# Patient Record
Sex: MALE | HISPANIC OR LATINO | Employment: FULL TIME | URBAN - METROPOLITAN AREA
[De-identification: names, ages, dates, MRNs, and addresses within clinical notes are randomized per-mention and may not be internally consistent; named-entity substitution may affect disease eponyms.]

---

## 2023-07-27 ENCOUNTER — APPOINTMENT (OUTPATIENT)
Dept: GENERAL RADIOLOGY | Facility: CLINIC | Age: 33
End: 2023-07-27
Attending: EMERGENCY MEDICINE

## 2023-07-27 ENCOUNTER — HOSPITAL ENCOUNTER (EMERGENCY)
Facility: CLINIC | Age: 33
Discharge: HOME | End: 2023-07-27
Attending: EMERGENCY MEDICINE | Admitting: EMERGENCY MEDICINE

## 2023-07-27 VITALS
OXYGEN SATURATION: 97 % | HEIGHT: 64 IN | WEIGHT: 160 LBS | BODY MASS INDEX: 27.31 KG/M2 | DIASTOLIC BLOOD PRESSURE: 76 MMHG | TEMPERATURE: 97 F | SYSTOLIC BLOOD PRESSURE: 142 MMHG | RESPIRATION RATE: 16 BRPM | HEART RATE: 76 BPM

## 2023-07-27 DIAGNOSIS — S63.501A WRIST SPRAIN, RIGHT, INITIAL ENCOUNTER: ICD-10-CM

## 2023-07-27 PROCEDURE — 99283 EMERGENCY DEPT VISIT LOW MDM: CPT

## 2023-07-27 PROCEDURE — 250N000013 HC RX MED GY IP 250 OP 250 PS 637: Performed by: EMERGENCY MEDICINE

## 2023-07-27 PROCEDURE — 73110 X-RAY EXAM OF WRIST: CPT | Mod: RT

## 2023-07-27 RX ORDER — IBUPROFEN 600 MG/1
600 TABLET, FILM COATED ORAL ONCE
Status: COMPLETED | OUTPATIENT
Start: 2023-07-27 | End: 2023-07-27

## 2023-07-27 RX ADMIN — IBUPROFEN 600 MG: 600 TABLET ORAL at 10:53

## 2023-07-27 NOTE — DISCHARGE INSTRUCTIONS
He came to the ER with wrist pain after moving a heavy box.  The x-rays were negative but given your localized tenderness, we considered a small fracture of the hand not able to be seen on x-ray.  You did not wish to pursue MRI of the wrist at this time and so we placed you in a splint and will plan to have you follow-up with the orthopedic specialist.  You can take ibuprofen and Tylenol for your pain.  Please avoid lifting heavy items with the right hand until you are cleared by the orthopedic clinic.  Return for new concerns or symptoms.

## 2023-07-27 NOTE — ED PROVIDER NOTES
"  History     Chief Complaint:  Hand Injury       HPI   Hunter Felix is a 33 year old male who presents to the ER for evaluation of right wrist pain.  Symptoms started after lifting a heavy box at work.  He heard a crack and felt immediate pain in this region.  No numbness or weakness in the hand.  He has not taken anything for pain.  He denies other medical problems.        Medications:    No current outpatient medications on file.      Past Medical History:    No past medical history on file.    Past Surgical History:    No past surgical history on file.     Physical Exam   Patient Vitals for the past 24 hrs:   BP Temp Temp src Pulse Resp SpO2 Height Weight   07/27/23 0913 (!) 142/76 97  F (36.1  C) Temporal 76 16 97 % 1.626 m (5' 4\") 72.6 kg (160 lb)        Physical Exam  VS: Reviewed per above  HENT: normal speech  EYES: sclera anicteric  CV: Rate as noted, regular rhythm.   RESP: Effort normal.   NEURO: Alert, moving all extremities  MSK: No deformity of the extremities, focal ttp over the R triquetral/pisiform region  SKIN: Warm and dry  Emergency Department Course       Imaging:  XR Wrist Right G/E 3 Views   Final Result   IMPRESSION: The right wrist is negative for fracture. Normal carpal   alignment.      BRANDON HELLER MD            SYSTEM ID:  [id number]         Report per radiology        Emergency Department Course & Assessments:             Interventions:  Medications   ibuprofen (ADVIL/MOTRIN) tablet 600 mg (600 mg Oral $Given 7/27/23 1053)          Disposition:  The patient was discharged to home.     Impression & Plan        Medical Decision Making:  Patient presents to the ER for valuation of right ulnar wrist/hand pain after lifting a heavy box at work.  Vital signs reassuring.  No signs of neurovascular compromise in the right upper extremity distally.  There is focal tenderness over the triquetral/Pisiform area but no fracture or dislocation on x-ray.  Offered MR imaging to look for " occult traumatic injury but patient declined.  He was placed in removable splint and referred to orthopedics for ongoing evaluation.  Return precautions discussed prior to discharge.      Diagnosis:    ICD-10-CM    1. Wrist sprain, right, initial encounter  S63.501A Wrist/Arm Supplies Order Wrist Brace; Right; non-thumb spica           Discharge Medications:  There are no discharge medications for this patient.          Jsoef Vargas MD  07/27/23 1557

## 2023-07-27 NOTE — LETTER
July 31, 2023      To Whom It May Concern:      Hunter Felix was seen in our Emergency Department, 07/27/23.  I expect his condition to improve over the next 3 days.  He may return to work when improved or has followed up with [facility]    Sincerely,        Stas BARROW/Dr. Vargas

## 2024-05-12 ENCOUNTER — HOSPITAL ENCOUNTER (EMERGENCY)
Facility: CLINIC | Age: 34
Discharge: HOME | End: 2024-05-12
Attending: EMERGENCY MEDICINE | Admitting: EMERGENCY MEDICINE

## 2024-05-12 VITALS
SYSTOLIC BLOOD PRESSURE: 122 MMHG | TEMPERATURE: 98.8 F | RESPIRATION RATE: 18 BRPM | OXYGEN SATURATION: 96 % | DIASTOLIC BLOOD PRESSURE: 77 MMHG | HEART RATE: 110 BPM

## 2024-05-12 DIAGNOSIS — F43.20 ADJUSTMENT DISORDER, UNSPECIFIED TYPE: ICD-10-CM

## 2024-05-12 DIAGNOSIS — F10.10 NONDEPENDENT ALCOHOL ABUSE, EPISODIC DRINKING BEHAVIOR: Primary | ICD-10-CM

## 2024-05-12 PROBLEM — F10.920 ALCOHOLIC INTOXICATION WITHOUT COMPLICATION (H): Status: ACTIVE | Noted: 2024-05-12

## 2024-05-12 PROBLEM — F43.23 ADJUSTMENT DISORDER WITH MIXED ANXIETY AND DEPRESSED MOOD: Status: ACTIVE | Noted: 2024-05-12

## 2024-05-12 LAB
ALBUMIN SERPL BCG-MCNC: 4.8 G/DL (ref 3.5–5.2)
ALP SERPL-CCNC: 105 U/L (ref 40–150)
ALT SERPL W P-5'-P-CCNC: 70 U/L (ref 0–70)
AMPHETAMINES UR QL SCN: ABNORMAL
ANION GAP SERPL CALCULATED.3IONS-SCNC: 15 MMOL/L (ref 7–15)
AST SERPL W P-5'-P-CCNC: 34 U/L (ref 0–45)
BARBITURATES UR QL SCN: ABNORMAL
BASOPHILS # BLD AUTO: 0 10E3/UL (ref 0–0.2)
BASOPHILS NFR BLD AUTO: 0 %
BENZODIAZ UR QL SCN: ABNORMAL
BILIRUB SERPL-MCNC: 0.3 MG/DL
BUN SERPL-MCNC: 6.3 MG/DL (ref 6–20)
BZE UR QL SCN: ABNORMAL
CALCIUM SERPL-MCNC: 8.9 MG/DL (ref 8.6–10)
CANNABINOIDS UR QL SCN: ABNORMAL
CHLORIDE SERPL-SCNC: 105 MMOL/L (ref 98–107)
CREAT SERPL-MCNC: 0.85 MG/DL (ref 0.67–1.17)
DEPRECATED HCO3 PLAS-SCNC: 21 MMOL/L (ref 22–29)
EGFRCR SERPLBLD CKD-EPI 2021: >90 ML/MIN/1.73M2
EOSINOPHIL # BLD AUTO: 0 10E3/UL (ref 0–0.7)
EOSINOPHIL NFR BLD AUTO: 0 %
ERYTHROCYTE [DISTWIDTH] IN BLOOD BY AUTOMATED COUNT: 12.2 % (ref 10–15)
ETHANOL SERPL-MCNC: 0.12 G/DL
FENTANYL UR QL: ABNORMAL
GLUCOSE SERPL-MCNC: 118 MG/DL (ref 70–99)
HCT VFR BLD AUTO: 42.8 % (ref 40–53)
HGB BLD-MCNC: 15.3 G/DL (ref 13.3–17.7)
HOLD SPECIMEN: NORMAL
IMM GRANULOCYTES # BLD: 0 10E3/UL
IMM GRANULOCYTES NFR BLD: 0 %
LIPASE SERPL-CCNC: 31 U/L (ref 13–60)
LYMPHOCYTES # BLD AUTO: 1.7 10E3/UL (ref 0.8–5.3)
LYMPHOCYTES NFR BLD AUTO: 24 %
MAGNESIUM SERPL-MCNC: 2.3 MG/DL (ref 1.7–2.3)
MCH RBC QN AUTO: 30.3 PG (ref 26.5–33)
MCHC RBC AUTO-ENTMCNC: 35.7 G/DL (ref 31.5–36.5)
MCV RBC AUTO: 85 FL (ref 78–100)
MONOCYTES # BLD AUTO: 0.5 10E3/UL (ref 0–1.3)
MONOCYTES NFR BLD AUTO: 8 %
NEUTROPHILS # BLD AUTO: 4.6 10E3/UL (ref 1.6–8.3)
NEUTROPHILS NFR BLD AUTO: 68 %
NRBC # BLD AUTO: 0 10E3/UL
NRBC BLD AUTO-RTO: 0 /100
OPIATES UR QL SCN: ABNORMAL
PCP QUAL URINE (ROCHE): ABNORMAL
PLATELET # BLD AUTO: 232 10E3/UL (ref 150–450)
POTASSIUM SERPL-SCNC: 3.7 MMOL/L (ref 3.4–5.3)
PROT SERPL-MCNC: 8 G/DL (ref 6.4–8.3)
RBC # BLD AUTO: 5.05 10E6/UL (ref 4.4–5.9)
SODIUM SERPL-SCNC: 141 MMOL/L (ref 135–145)
WBC # BLD AUTO: 6.8 10E3/UL (ref 4–11)

## 2024-05-12 PROCEDURE — 36415 COLL VENOUS BLD VENIPUNCTURE: CPT | Performed by: EMERGENCY MEDICINE

## 2024-05-12 PROCEDURE — 80307 DRUG TEST PRSMV CHEM ANLYZR: CPT | Performed by: EMERGENCY MEDICINE

## 2024-05-12 PROCEDURE — 80053 COMPREHEN METABOLIC PANEL: CPT | Performed by: EMERGENCY MEDICINE

## 2024-05-12 PROCEDURE — 83690 ASSAY OF LIPASE: CPT | Performed by: EMERGENCY MEDICINE

## 2024-05-12 PROCEDURE — 82077 ASSAY SPEC XCP UR&BREATH IA: CPT | Performed by: EMERGENCY MEDICINE

## 2024-05-12 PROCEDURE — 83735 ASSAY OF MAGNESIUM: CPT | Performed by: EMERGENCY MEDICINE

## 2024-05-12 PROCEDURE — 85025 COMPLETE CBC W/AUTO DIFF WBC: CPT | Performed by: EMERGENCY MEDICINE

## 2024-05-12 PROCEDURE — 99285 EMERGENCY DEPT VISIT HI MDM: CPT

## 2024-05-12 ASSESSMENT — COLUMBIA-SUICIDE SEVERITY RATING SCALE - C-SSRS: IS THE PATIENT NOT ABLE TO COMPLETE C-SSRS: UNABLE TO VERBALIZE

## 2024-05-12 ASSESSMENT — ACTIVITIES OF DAILY LIVING (ADL)
ADLS_ACUITY_SCORE: 35
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
## 2024-05-12 NOTE — DISCHARGE INSTRUCTIONS
"Aftercare Plan  If I am feeling unsafe or I am in a crisis, I will:   Contact my established care providers   Call the National Suicide Prevention Lifeline: 988  Go to the nearest emergency room   Call 911       Your [facility] has a mental health crisis team you can call 24/7: [facility] [place] [facility]  [phone]       Crisis Lines  Crisis Text Line  Text 249716  You will be connected with a trained live crisis counselor to provide support.    Por espanol, texto  MANUELA a 877428 o texto a 442-AYUDAME en WhatsA    The Dandre Project (LGBTQ Youth Crisis Line)  1.293.303.5601  text START to 957-148      Community Resources  Fast Tracker  Linking people to mental health and substance use disorder resources  [employer]     [place] Mental Health Warm Line  Peer to peer support  Monday thru Saturday, 12 pm to 10 pm  774.270.0110 or 4.365.796.6909  Text \"Support\" to 62632    National [place] on Mental Illness (ULISES)  967.965.3850 or 1.888.ULISES.HELPS      Mental Health Apps  My3  https://Hometapper.org/    VirtualBizArkeBox  https://[employer].org/apps/virtual-hope-box/          *Comunidades Latinas Unidas En Servicio (CLUES) ofrece un espacio seguro en el que profesionales pueden ayudarle a mejorar sal capacidad para enfrentar los problemas, construir sal autoestima y aumentar sal sentido general de bienestar.     Ha tenido mucho estrés o problemas para dormir?   Ha tenido sal hijo cambios en el comportamiento o conflictos en meron relaciones personales?   Ha experimentado algo traumático y quiere hablar con alguien?  Los servicios están disponibles en español e inglés, y aceptamos la mayoría de los principales seguros y ofrecemos jenna tarifa fija para aquellos que no tienen seguro.    La mayoría de los servicios de francisco javier mental están disponibles a través de la telesalud. Por favor, infórmese sobre el servicio concreto que busca.  **El horario de admisión sin lio previa no está disponible debido a " Covid-19    Terapia de Francisco Javier Mental  La terapia de francisco javier mental apoya a los clientes que se enfrentan a la ansiedad, el estrés, la depresión u otras necesidades de francisco javier mental para ayudarles a desarrollar habilidades de afrontamiento y a curarse del dolor o las luchas emocionales. Nuestros terapeutas de francisco javier mental ayudan a niños, adolescentes, adultos, parejas y familias a aplicar estrategias y conceptos desarrollados en la terapia para ayudarles a vivir jenna joy más guerrero y saludable.    Para concertar jenna lio de admisión, llame al [phone], opción 4.  **El horario de admisión sin lio previa no está disponible debido a Covid-19    [facility]  [address] [place] [address]    Tel.: [phone]  Fax: [phone]    [address] [address]    Tel.: [phone]  Fax: [phone]    Monday-Friday: 8:30am - 5pm  Saturday-Sunday: Closed   Email: [email or web address]        *[facility]  Ayuda Para Todos Los Adultos  [facility] prestado meron servicios en español desde el año 2001. Desde el momento que realiza sal primera llamada hasta sal última lio de terapia, usted puede hablarnos en el idioma en el que se sienta más cómodo. Nuestro equipo hispanohablante está disponible para ayudarle a solucionar meron problemas y mejorar sal joy.    Servicios Centrados en el Cliente  [facility] acepta a todos. Estamos aquí para usted, sin importar sal francisco javier mental, idioma o monique cuanto tiempo necesite cuidados. Nuestros servicios también son asequibles.    Nuestro Equipo preparado y conprensivo esta aqui para ayudarlo a usted. Nuestros psicólogos, trabajadores sociales, psiquiatras, enfermeros/as, terapeutas matrimoniales y de dane certificados y trabajan juntos para ofrecerle el tratamiento que mejor cubra meron necesidades.    Todos nuestros servicios tienen en cuenta el trauma, las diferencias culturales y sin opresiones.  Ofrecemos:    Terapia Individual, en  Nav, de Mayuri y de Iker  Evaluación Psicológica  Servicios de Psiquiatría  Nuestro preparado y comprensivo equipo de francisco javier está aquí para ayudarle a usted. Tanto si se siente ansioso o deprimido, tiene problemas en sal relación, se está recuperando de un trauma o tiene otros problemas en sal joy, queremos que se sienta mejor.    CONTACTOS  Llámenos para programar jenna lio de admisión hoy.    Kari de telefono: [phone]  Kari de Fax: [phone]  Por favor Llame al 911 en alice de emergencia.        *[facility], [employer].  Welcome  Today you are one step closer to a new you where you feel empowered and on a positive path to growth and well-being.    As a psychodynamic therapist, my goal is to help you uncover your true potential and lead a life that is worth celebrating. While we can't change difficult situations of the past, we can work together to better understand and resolve challenges in your life. By applying complementary therapy approaches and techniques, we will unearth long-standing behavior patterns or negative perceptions that may be holding you back from experiencing a more fulfilling and meaningful life.    If you're looking for extra support and guidance through a challenging situation or you're just ready to move in a new direction in your life, I look forward to working with you to achieve your goals.    Please call or email me for an individual, couples or family therapy consultation today.  Kari de telefono: [phone]            Sliding Scale Resources:     [facility] ([facility])  Website: [email or web address]://[facility]/  Services: Medical care, dental care, mental health care, legal services, and care coordination services.   Location(s): [address] [place] [address]  Phone: [phone]    [facility]  Website: https://www.[facility].org/cost/sliding-fee-discount/  Services: Medical Care and Therapy  Location(s): Several  clinics around the [facility]  Phone: [phone]    [facility]  Website: [email or web address]://[email or web address]  Services: Free counseling (M,W,F: 1:00p-3:00p for in-person/virtual, M,T,W,Th: 5:30p-7:30p for virtual)  Location(s): [address] [place] [address]    Phone: [phone]    [facility]  Website: https://www.[place]./[facility]/health-medical/mental-health-center  Services: Assessments, therapy, medication services, care coordination/resource assistance  Location(s): [address] [place] [address]  Phone: [phone]    [facility]  Website: [email or web address] Center[facility]ppingmn.Argus/  Services: Outpatient mental health services for people covered by [place] Health Care Programs or private insurance. People without insurance may be eligible to receive services on a sliding fee schedule.  Location(s): [facility], [place], [facility], [facility]  Phone: [phone]     [facility]  Website: https://[facility] Hospital[facility]/  Services: Outpatient mental health services for people covered by [place] Health [place] Programs or private insurance. People without insurance may be eligible to receive services on a sliding fee schedule.  Location(s): [facility], [facility]  Phone: [phone]    National [place] on Mental Illness of [place] (ULISES [place])  Website: https://namimn.org/  Services: Classes and support groups are offered free of charge. Please check the website for up-to-date information on parent resource groups, classes and workshops.    Location(s): [address] [facility], [address] [place] [address]  Phone: [phone] // 0-229-TUSV-HELPS     [facility]:  Website: https://www.[facility].[email or web address]/  Services: Will help get connected with mental health and physical health services, set up and access  insurance, access safe and stable housing, and offers case management services to help you stay on track to reach personal goals.  Location(s): [address] [place] [address]  Phone: [phone]       [facility] ([facility])   Website: https://www.[facility]/[place]/  Services: Drop in center that is staffed with nursing students, nursing staff, student PA's and faculty.  Not a clinic, but focuses on basic healthcare and personal hygiene needs. Closed when schools are closed for the weather or on school breaks, so call ahead. Monday and Thursday 10-11:30 am.    Location(s): [address] [address]   Phone: [phone]      [facility]   Website: https://www.[facility].org/clinic/[facility]/  Services: Monday-Friday 8 am-5 pm. Call for appointment. Healthcare for all ages    Location(s): [address] [facility] [address]th [facility], [facility] [address]   Phone: [phone]       [facility]   Website: vwww.[facility].org    Services: By appointment only. (Some same-day appointments available.) Monday-Thursday 9 am-7 pm; Friday 9 am-4pm; Saturday 10 am-2 pm. Sliding fee scale.  Sexual health, women's health, family planning.   Location(s): [address], [place] [address]  Phone: [phone]     [facility]   Website: [email or web address].[email or web address]/  Services: Monday-Friday 8 am-5 pm. Sliding fee scale. Cancer screens, CD and mental health services.  Basic medical for adults and peds.  Family planning, testing hypertension screenings.   Location(s): [address] [facility] [address] [facility] [address] [address] [facility] [address] [address]   Phone: [phone]      [facility]   Website: [email or web address]://www.[email or web address]/  Services: Monday-Friday 8 am-5 pm.  Provides: provides a large range of medical services   Location(s): [address] [facility] [address]   Phone: [phone]    [facility]   Website: https://www.[email or web address].[email or web address]/  Services: Medical Care, Dental Care, Behavioral Health, and Supportive Services  Location(s): [facility] [address] [place] [address] & [address] [place] [address]  Phone: [phone]      [facility]   Website: [email or web address].[email or web address]/pnc  Services: Our goal in making PNC services available to our patients is to provide everyone the opportunity to receive free, quality health care regardless of their insurance situation, citizenship status, or financial background. Medical Services, Dental, Mental Health, Nutrition, Lab Services, Legal Services, and Pharmaceutical Care.   Location(s): [address] [place] [address] ([facility])  Phone: [phone]      [facility]   Website: [email or web address]://www.[email or web address]/  Services: Medical, Dental, Behavioral Health, Vision, and MNSure enrollment assistance.   Location(s):    Medical & Behavioral Health: [address] [address]   Dental & Vision: [address] [place] [address]  Phone:   Medical: [phone]  Dental: [phone]  Vision: [phone]  Behavioral Health: [phone]     [facility]   Website: [email or web address]://[email or web address].[email or web address]/  Services: Services provided by [facility] include the patient's visit to the clinic, lab tests, xrays, diagnostic tests, and most medications. Specialty referrals are provided to the patient without charge through coordinated efforts with area health care providers.   Location(s): [address] [place] [address]  Phone: [phone]     [facility]   Website:  https://www.[facility].org/clinic/[facility]/  Services: See website for full list  - Family Medicine (primary care for all genders and all ages)  - Women's Care - Obstetrics / Gynecology  - Lactation Consultants  - Pediatrics (care for infants, children, adolescents)  - Integrative Health  - Chiropractic  - Acupuncture  - Integrative Primary Care  - Financial Counseling  - Diagnostic Imaging  - Lab Services  Location(s): [address] [place], [address]  Phone: [phone]      [employer]  Website: [email or web address]  Discounted mediation at certain pharmacies    Additional Information  Today you were seen by a licensed mental health professional through Triage and Transition services, Behavioral Healthcare Providers (P)  for a crisis assessment in the Emergency Department at [facility].  It is recommended that you follow up with your established providers (psychiatrist, mental health therapist, and/or primary care doctor - as relevant) as soon as possible. Coordinators from [facility] will be calling you in the next 24-48 hours to ensure that you have the resources you need.  You can also contact [facility] coordinators directly at [phone]. You may have been scheduled for or offered an appointment with a mental health provider. [facility] maintains an extensive network of licensed behavioral health providers to connect patients with the services they need.  We do not charge providers a fee to participate in our referral network.  We match patients with providers based on a patient's specific needs, insurance coverage, and location.  Our first effort will be to refer you to a provider within your care system, and will utilize providers outside your care system as needed.

## 2024-05-12 NOTE — ED PROVIDER NOTES
"  Emergency Department Note      History of Present Illness     Chief Complaint  Drug / Alcohol Assessment    HPI  Hunter Felix is a 34 year old male who presents to the ED via PD for evaluation of drug/alcohol assessment. Additional history obtained from patient's wife. Patient reports consumption of 6-7 beers and other unspecified drugs that he ingested via smoking and snorting early this morning with friends. He denies daily alcohol or drug use. Wife mentions he began drinking at midnight but there was a marked change in his behavior around 0500. Patient states he blacked out around 0500 and does not recall events that took place until he was en route to the ED. Wife states he seemed \"out of it\" and his mentation was much different from his baseline during this time. He repeated phrases such as \"I'm a bad person\" and self-inflicted harm by biting and punching himself, and banging his head on the walls and mirrors. Wife and daughter tried to restrain the patient in attempt to stop these self-injurious behaviors but he was resistant and pushed them off of himself. Patient endorses hallucinations of his  cousin telling him to \"come\", and wife reports the patient repeated the cousin's name and stated things like \"he's coming for me\". There were reportedly unspecified blue pills found in the patient's car. Wife mentions the patient had a similar presentation of anxiousness about 1 month ago, but not as severe as today's episode, and resolved after he fell asleep that night. Additionally, wife states she incidentally found out that the patient lost his job 2-3 days ago and did not tell his family. No history of psychiatric illness or hospitalizations. Patient states he has been intermittently passively suicidal for the past 3 years. He currently endorses a headache and generalized body aches.    Independent Historian  Wife as detailed above.    Review of External Notes  Patient last seen in the ER " July 27, 2023 for wrist pain    Past Medical History   Medical History and Problem List  History reviewed. No pertinent past medical history.    Medications  No current outpatient medications on file.    Surgical History   History reviewed. No pertinent surgical history.    Physical Exam   Patient Vitals for the past 24 hrs:   BP Temp Temp src Pulse Resp SpO2   05/12/24 0812 122/77 98.8  F (37.1  C) Temporal 110 18 96 %     Physical Exam  Vitals reviewed.   Constitutional:       General: He is not in acute distress.     Appearance: He is not ill-appearing.   HENT:      Head: Normocephalic and atraumatic.   Eyes:      Extraocular Movements: Extraocular movements intact.   Cardiovascular:      Rate and Rhythm: Normal rate.   Pulmonary:      Effort: Pulmonary effort is normal. No respiratory distress.      Breath sounds: Normal breath sounds.   Musculoskeletal:      Cervical back: Normal range of motion.   Skin:     General: Skin is warm and dry.   Neurological:      Mental Status: He is alert and oriented to person, place, and time.      GCS: GCS eye subscore is 4. GCS verbal subscore is 5. GCS motor subscore is 6.   Psychiatric:         Behavior: Behavior normal.      Comments: Patient is alert, oriented denies any hallucinations.  Denies any suicidal ideations.  He is sitting comfortably and is cooperative.           Diagnostics   Lab Results   Labs Ordered and Resulted from Time of ED Arrival to Time of ED Departure   COMPREHENSIVE METABOLIC PANEL - Abnormal       Result Value    Sodium 141      Potassium 3.7      Carbon Dioxide (CO2) 21 (*)     Anion Gap 15      Urea Nitrogen 6.3      Creatinine 0.85      GFR Estimate >90      Calcium 8.9      Chloride 105      Glucose 118 (*)     Alkaline Phosphatase 105      AST 34      ALT 70      Protein Total 8.0      Albumin 4.8      Bilirubin Total 0.3     ETHYL ALCOHOL LEVEL - Abnormal    Alcohol ethyl 0.12 (*)    URINE DRUG SCREEN PANEL - Abnormal    Amphetamines Urine  Screen Positive (*)     Barbituates Urine Screen Negative      Benzodiazepine Urine Screen Negative      Cannabinoids Urine Screen Negative      Cocaine Urine Screen Positive (*)     Fentanyl Qual Urine Screen Negative      Opiates Urine Screen Negative      PCP Urine Screen Negative     LIPASE - Normal    Lipase 31     MAGNESIUM - Normal    Magnesium 2.3     CBC WITH PLATELETS AND DIFFERENTIAL    WBC Count 6.8      RBC Count 5.05      Hemoglobin 15.3      Hematocrit 42.8      MCV 85      MCH 30.3      MCHC 35.7      RDW 12.2      Platelet Count 232      % Neutrophils 68      % Lymphocytes 24      % Monocytes 8      % Eosinophils 0      % Basophils 0      % Immature Granulocytes 0      NRBCs per 100 WBC 0      Absolute Neutrophils 4.6      Absolute Lymphocytes 1.7      Absolute Monocytes 0.5      Absolute Eosinophils 0.0      Absolute Basophils 0.0      Absolute Immature Granulocytes 0.0      Absolute NRBCs 0.0         ED Course    Medications Administered  Medications - No data to display    Procedures  Procedures     Discussion of Management  None    Social Determinants of Health adding to complexity of care  None    ED Course  ED Course as of 05/12/24 1242   Sun May 12, 2024   0920 I obtained history from the patient's wife and mother.   0933 I obtained history and examined the patient as noted above.    1057 Alcohol ethyl(!): 0.12   1101 Amphetamine Qual Urine(!): Screen Positive   1101 Cocaine Urine(!): Screen Positive   1208 I spoke with DEC  regarding their assessment of the patient. We agree that patient is appropriate for discharge with resources for outpatient treatment.     Medical Decision Making / Diagnosis   CMS Diagnoses: None    MIPS     None    [facility]  Hunter Donnie Felix is a 34 year old male who presents today for mental health evaluation.  Reported last night he had 6 beers, also use possible methamphetamine and another drug with his friends.  Denies any regular drug use.  Or frequent  "alcohol use.  Wife describes that patient was having hallucinations seeing people that were dead and talking to them.  States that patient has never had any history of psychosis or depression.  He recently lost his job within the last 2 to 3 days.  Patient at this time is alert, oriented and cooperative.  He is able to provide history.  He states that he \"blacked out\" does not recall events that occurred last night or this morning.  I discussed with him the plan for blood work and DEC assessment.  He agrees with plan of care.  Blood work significant for an alcohol level of 0.12 drug screen positive for amphetamines and cocaine.  DEC assessed patient as well as talk to wife.  They state that patient is cleared for discharge at this time.  Safety plan in place.  Wife is comfortable with patient going home and feels safe with patient.  Resources provided patient stable for discharge at this time.    Disposition  The patient was discharged.     ICD-10 Codes:    ICD-10-CM    1. Nondependent alcohol abuse, episodic drinking behavior  F10.10       2. Adjustment disorder, unspecified type  F43.20              Scribe Disclosure:  Pasha SILVESTRE Hailie, am serving as a scribe at 10:55 AM on 5/12/2024 to document services personally performed by Elsa Byrnes DO based on my observations and the provider's statements to me.     [employer]      Elsa Byrnes DO  05/12/24 5446    "

## 2024-05-12 NOTE — ED NOTES
Bed: [facility]  Expected date:   Expected time:   Means of arrival:   Comments:  Hems 451 34 M SI on hold ETOH and other substances on board cooperative eta 0717